# Patient Record
Sex: FEMALE | Race: WHITE | NOT HISPANIC OR LATINO | Employment: OTHER | URBAN - METROPOLITAN AREA
[De-identification: names, ages, dates, MRNs, and addresses within clinical notes are randomized per-mention and may not be internally consistent; named-entity substitution may affect disease eponyms.]

---

## 2023-08-26 PROBLEM — F02.80 PRIMARY DEGENERATIVE DEMENTIA OF THE ALZHEIMER TYPE, SENILE ONSET, UNCOMPLICATED (MULTI): Status: ACTIVE | Noted: 2023-08-26

## 2023-08-26 PROBLEM — M35.3 POLYMYALGIA (MULTI): Status: ACTIVE | Noted: 2023-08-26

## 2023-08-26 PROBLEM — G89.29 OTHER CHRONIC PAIN: Status: ACTIVE | Noted: 2023-08-26

## 2023-08-26 PROBLEM — D50.9 IRON DEFICIENCY ANEMIA: Status: ACTIVE | Noted: 2023-08-26

## 2023-08-26 PROBLEM — I35.8 AORTIC VALVE SCLEROSIS: Status: ACTIVE | Noted: 2023-08-26

## 2023-08-26 PROBLEM — G30.1 PRIMARY DEGENERATIVE DEMENTIA OF THE ALZHEIMER TYPE, SENILE ONSET, UNCOMPLICATED (MULTI): Status: ACTIVE | Noted: 2023-08-26

## 2023-08-26 PROBLEM — L90.0 LICHEN SCLEROSUS ET ATROPHICUS: Status: ACTIVE | Noted: 2023-08-26

## 2023-08-26 PROBLEM — I09.9 RHEUMATIC HEART DISEASE: Status: ACTIVE | Noted: 2023-08-26

## 2023-08-26 PROBLEM — G56.92 NEUROPATHY OF LEFT UPPER EXTREMITY: Status: ACTIVE | Noted: 2023-08-26

## 2023-08-26 PROBLEM — R26.89 BALANCE DISORDER: Status: ACTIVE | Noted: 2023-08-26

## 2023-08-26 PROBLEM — E78.5 HYPERLIPIDEMIA: Status: ACTIVE | Noted: 2023-08-26

## 2023-08-26 PROBLEM — I10 ESSENTIAL HYPERTENSION: Status: ACTIVE | Noted: 2023-08-26

## 2023-08-26 PROBLEM — R63.1 EXCESSIVE THIRST: Status: ACTIVE | Noted: 2023-08-26

## 2023-08-26 PROBLEM — I06.0 RHEUMATIC AORTIC STENOSIS: Status: ACTIVE | Noted: 2023-08-26

## 2023-08-26 PROBLEM — K22.70 BARRETT ESOPHAGUS: Status: ACTIVE | Noted: 2023-08-26

## 2023-08-26 PROBLEM — E55.9 VITAMIN D DEFICIENCY: Status: ACTIVE | Noted: 2023-08-26

## 2023-08-26 PROBLEM — N81.11 CYSTOCELE, MIDLINE: Status: ACTIVE | Noted: 2023-08-26

## 2023-08-26 PROBLEM — E03.8 SUBCLINICAL HYPOTHYROIDISM: Status: ACTIVE | Noted: 2023-08-26

## 2023-08-26 PROBLEM — G47.19 EXCESSIVE DAYTIME SLEEPINESS: Status: ACTIVE | Noted: 2023-08-26

## 2023-08-26 PROBLEM — F02.80 DEMENTIA ASSOCIATED WITH OTHER UNDERLYING DISEASE WITHOUT BEHAVIORAL DISTURBANCE (MULTI): Status: ACTIVE | Noted: 2023-08-26

## 2023-08-26 RX ORDER — ACETAMINOPHEN 500 MG
TABLET ORAL
COMMUNITY

## 2023-08-26 RX ORDER — RAMIPRIL 10 MG/1
CAPSULE ORAL
COMMUNITY
End: 2023-11-30 | Stop reason: SDUPTHER

## 2023-08-26 RX ORDER — BETAMETHASONE DIPROPIONATE 0.5 MG/G
CREAM TOPICAL
COMMUNITY
Start: 2023-02-01 | End: 2023-10-26 | Stop reason: WASHOUT

## 2023-08-26 RX ORDER — GABAPENTIN 100 MG/1
1 CAPSULE ORAL 2 TIMES DAILY
COMMUNITY
End: 2023-11-29

## 2023-08-26 RX ORDER — ESOMEPRAZOLE MAGNESIUM 40 MG/1
1 CAPSULE, DELAYED RELEASE ORAL DAILY
COMMUNITY
End: 2023-11-30 | Stop reason: SDUPTHER

## 2023-08-26 RX ORDER — AZITHROMYCIN 250 MG/1
TABLET, FILM COATED ORAL
COMMUNITY
Start: 2023-03-22 | End: 2023-10-26 | Stop reason: WASHOUT

## 2023-08-26 RX ORDER — BENZONATATE 100 MG/1
CAPSULE ORAL
COMMUNITY
Start: 2023-03-24 | End: 2023-10-26 | Stop reason: WASHOUT

## 2023-08-26 RX ORDER — DILTIAZEM HYDROCHLORIDE 180 MG/1
CAPSULE, EXTENDED RELEASE ORAL
COMMUNITY
End: 2024-06-04

## 2023-08-26 RX ORDER — HYDROCHLOROTHIAZIDE 25 MG/1
25 TABLET ORAL
COMMUNITY

## 2023-09-14 ENCOUNTER — HOSPITAL ENCOUNTER (OUTPATIENT)
Dept: DATA CONVERSION | Facility: HOSPITAL | Age: 88
Discharge: HOME | End: 2023-09-14
Payer: MEDICARE

## 2023-09-14 DIAGNOSIS — M54.50 LOW BACK PAIN, UNSPECIFIED: ICD-10-CM

## 2023-09-14 DIAGNOSIS — R82.90 UNSPECIFIED ABNORMAL FINDINGS IN URINE: ICD-10-CM

## 2023-09-14 LAB
AMOXICILLIN+CLAV SUSC ISLT: NORMAL
AMPICILLIN SUSC ISLT: NORMAL
AMPICILLIN+SULBAC SUSC ISLT: NORMAL
BACTERIA ISLT: NORMAL
BACTERIA SPEC CULT: NORMAL
BACTERIA UR QL AUTO: POSITIVE
BILIRUB UR QL STRIP.AUTO: NEGATIVE
CC # UR: NORMAL /UL
CEFAZOLIN SUSC ISLT: NORMAL
CIPROFLOXACIN SUSC ISLT: NORMAL
CLARITY UR: CLEAR
COLOR UR: ABNORMAL
GENTAMICIN ISLT MLC: NORMAL
GLUCOSE UR STRIP.AUTO-MCNC: NEGATIVE MG/DL
HGB UR QL STRIP.AUTO: 1 /HPF (ref 0–3)
HGB UR QL: NEGATIVE
HYALINE CASTS UR QL AUTO: ABNORMAL /LPF
KETONES UR QL STRIP.AUTO: NEGATIVE
LEUKOCYTE ESTERASE UR QL STRIP.AUTO: ABNORMAL
LEVOFLOXACIN SUSC ISLT: NORMAL
MEROPENEM SUSC ISLT: NORMAL
MIC (SUSCEPTIBILITY): NORMAL
MICROSCOPIC (UA): ABNORMAL
NITRITE UR QL STRIP.AUTO: POSITIVE
NITROFURANTOIN SUSC ISLT: NORMAL
PH UR STRIP.AUTO: 6 [PH] (ref 4.6–8)
PIP+TAZO SUSC ISLT: NORMAL
PROT UR STRIP.AUTO-MCNC: NEGATIVE MG/DL
REPORT STATUS -LH SQ DATA CONVERSION: NORMAL
SERVICE CMNT-IMP: NORMAL
SP GR UR STRIP.AUTO: 1.01 (ref 1–1.03)
SPECIMEN SOURCE: NORMAL
SQUAMOUS UR QL AUTO: ABNORMAL /HPF
TETRACYCLINE SUSC ISLT: NORMAL
TMP SMX SUSC ISLT: NORMAL
URINE CULTURE: ABNORMAL
UROBILINOGEN UR QL STRIP.AUTO: NORMAL MG/DL (ref 0–1)
WBC #/AREA URNS AUTO: 14 /HPF (ref 0–3)

## 2023-10-02 ENCOUNTER — TELEPHONE (OUTPATIENT)
Dept: PRIMARY CARE | Facility: CLINIC | Age: 88
End: 2023-10-02
Payer: MEDICARE

## 2023-10-02 ENCOUNTER — LAB (OUTPATIENT)
Dept: LAB | Facility: LAB | Age: 88
End: 2023-10-02
Payer: MEDICARE

## 2023-10-02 DIAGNOSIS — R30.0 DYSURIA: ICD-10-CM

## 2023-10-02 LAB
APPEARANCE UR: ABNORMAL
BILIRUB UR STRIP.AUTO-MCNC: NEGATIVE MG/DL
COLOR UR: ABNORMAL
GLUCOSE UR STRIP.AUTO-MCNC: NORMAL MG/DL
KETONES UR STRIP.AUTO-MCNC: NEGATIVE MG/DL
LEUKOCYTE ESTERASE UR QL STRIP.AUTO: ABNORMAL
NITRITE UR QL STRIP.AUTO: NEGATIVE
PH UR STRIP.AUTO: 5.5 [PH]
PROT UR STRIP.AUTO-MCNC: NEGATIVE MG/DL
RBC # UR STRIP.AUTO: NEGATIVE /UL
RBC #/AREA URNS AUTO: NORMAL /HPF
SP GR UR STRIP.AUTO: 1.01
UROBILINOGEN UR STRIP.AUTO-MCNC: NORMAL MG/DL
WBC #/AREA URNS AUTO: NORMAL /HPF

## 2023-10-02 PROCEDURE — 87186 SC STD MICRODIL/AGAR DIL: CPT

## 2023-10-02 PROCEDURE — 87086 URINE CULTURE/COLONY COUNT: CPT

## 2023-10-02 NOTE — TELEPHONE ENCOUNTER
Per daughter Giulia pt has UTI today, she did a home test that came back positive. She is asking for an order to drop off sample. She already got a specimen cup from the lab and can drop off today.

## 2023-10-05 LAB — BACTERIA UR CULT: ABNORMAL

## 2023-10-25 ENCOUNTER — TELEPHONE (OUTPATIENT)
Dept: PRIMARY CARE | Facility: CLINIC | Age: 88
End: 2023-10-25
Payer: MEDICARE

## 2023-10-26 ENCOUNTER — OFFICE VISIT (OUTPATIENT)
Dept: PRIMARY CARE | Facility: CLINIC | Age: 88
End: 2023-10-26
Payer: MEDICARE

## 2023-10-26 VITALS
OXYGEN SATURATION: 98 % | SYSTOLIC BLOOD PRESSURE: 118 MMHG | HEIGHT: 59 IN | HEART RATE: 74 BPM | BODY MASS INDEX: 27.01 KG/M2 | TEMPERATURE: 97.1 F | RESPIRATION RATE: 16 BRPM | WEIGHT: 134 LBS | DIASTOLIC BLOOD PRESSURE: 70 MMHG

## 2023-10-26 DIAGNOSIS — E55.9 VITAMIN D DEFICIENCY: ICD-10-CM

## 2023-10-26 DIAGNOSIS — G30.9 ALZHEIMER'S DISEASE (MULTI): ICD-10-CM

## 2023-10-26 DIAGNOSIS — K22.70 BARRETT'S ESOPHAGUS WITHOUT DYSPLASIA: ICD-10-CM

## 2023-10-26 DIAGNOSIS — F02.80 ALZHEIMER'S DISEASE (MULTI): ICD-10-CM

## 2023-10-26 DIAGNOSIS — I10 ESSENTIAL HYPERTENSION, BENIGN: ICD-10-CM

## 2023-10-26 DIAGNOSIS — N30.00 ACUTE CYSTITIS WITHOUT HEMATURIA: Primary | ICD-10-CM

## 2023-10-26 PROBLEM — M75.121 COMPLETE TEAR OF RIGHT ROTATOR CUFF: Status: ACTIVE | Noted: 2017-04-21

## 2023-10-26 PROBLEM — M25.511 PAIN IN JOINT OF RIGHT SHOULDER: Status: ACTIVE | Noted: 2017-04-21

## 2023-10-26 PROBLEM — M89.9 DISORDER OF BONE AND CARTILAGE: Status: ACTIVE | Noted: 2023-10-26

## 2023-10-26 PROBLEM — M94.9 DISORDER OF BONE AND CARTILAGE: Status: ACTIVE | Noted: 2023-10-26

## 2023-10-26 PROCEDURE — 1160F RVW MEDS BY RX/DR IN RCRD: CPT | Performed by: NURSE PRACTITIONER

## 2023-10-26 PROCEDURE — 99349 HOME/RES VST EST MOD MDM 40: CPT | Performed by: NURSE PRACTITIONER

## 2023-10-26 PROCEDURE — 1159F MED LIST DOCD IN RCRD: CPT | Performed by: NURSE PRACTITIONER

## 2023-10-26 PROCEDURE — 1126F AMNT PAIN NOTED NONE PRSNT: CPT | Performed by: NURSE PRACTITIONER

## 2023-10-26 PROCEDURE — 1036F TOBACCO NON-USER: CPT | Performed by: NURSE PRACTITIONER

## 2023-10-26 PROCEDURE — 3074F SYST BP LT 130 MM HG: CPT | Performed by: NURSE PRACTITIONER

## 2023-10-26 PROCEDURE — 3078F DIAST BP <80 MM HG: CPT | Performed by: NURSE PRACTITIONER

## 2023-10-26 ASSESSMENT — ENCOUNTER SYMPTOMS
VOMITING: 0
APPETITE CHANGE: 0
LIGHT-HEADEDNESS: 0
DIARRHEA: 0
CHILLS: 0
CONSTIPATION: 0
LOSS OF SENSATION IN FEET: 0
ARTHRALGIAS: 1
WHEEZING: 0
PALPITATIONS: 0
TROUBLE SWALLOWING: 0
FEVER: 0
DEPRESSION: 1
DIZZINESS: 0
ENDOCRINE COMMENTS: POSITIVE FOR THYROID DISEASE
NAUSEA: 0
OCCASIONAL FEELINGS OF UNSTEADINESS: 1
ABDOMINAL PAIN: 0
UNEXPECTED WEIGHT CHANGE: 0
COUGH: 0
SHORTNESS OF BREATH: 0

## 2023-10-26 ASSESSMENT — PAIN SCALES - GENERAL: PAINLEVEL: 0-NO PAIN

## 2023-10-26 NOTE — PROGRESS NOTES
Subjective   Patient ID: Enriqueta Dumont is a 89 y.o. female who presents for Follow-up (Chronic medical conditions).    Visit for 89 y/o female seen today in private apartment, accompanied by daughter Giulia and caregiver Aiyana for routine follow up of chronic medical conditions. Patient is sitting in chair at dining room table this afternoon. She is alert to self and place. Disoriented to time. She is able to answer simple questions regarding her health but is an overall poor historian 2/2 dementia. Her daughter and caregiver supplement HPI as needed. Patient lives in an apartment by herself. She does not drive or have a vehicle. She has children that live locally and check in frequently. Her caregiver is in the house 4x weekly. Patient has limited mobility. Ambulates with a cane. She denies recent fall or injury. Caregiver does try and get patient out to walk around the apartment complex. Family has cameras in the house and they monitor patient in the evenings. Patient requires assistance with ADLs. Her caregiver helps with dressing, bathing/hygiene and toileting. Her family/caregiver prepares all meals for patient. She denies appetite changes, signs of weight loss. Denies abdominal pain, nausea, vomiting. Denies bowel or bladder concerns. She will have occasional incontinence episodes if she does not make it to the bathroom in time. She reported worsening back pain last month. Was found to have a UTI. 9/16 urine culture was positive >100,000 CFU/ml E. Coli. She was treated with course of Macrobid. Denies any further urinary concerns. Denies back pain today. Patient has difficulty sleeping at times. She will wake up frequently and wander into the kitchen to get water or go to the bathroom in the evenings. Patient does not remember waking up frequently. She does take naps throughout the day. Patient has short term memory loss. She will frequently repeat the same stories and conversations. She talks about a lot of  memories from her childhood and past. Has not had any recent hospitalizations.     Home Visit:          Medically necessary due to: Illness or condition that results in activity lmitation or restriction that impacts the ability to leave home such as:, unsteady gait/poor balance, dementia/cognitive impairment         Current Outpatient Medications:     cholecalciferol (Vitamin D3) 50 mcg (2,000 unit) capsule, 1 capsule Orally Once a day, Disp: , Rfl:     dilTIAZem XR (DILT-XR) 180 mg 24 hr capsule, TAKE 1 CAPSULE BY MOUTH EVERY DAY FOR 90 DAYS Orally Once a day, Disp: , Rfl:     esomeprazole (NexIUM) 40 mg DR capsule, Take 1 capsule (40 mg) by mouth once daily. for 90 days, Disp: , Rfl:     gabapentin (Neurontin) 100 mg capsule, Take 1 capsule (100 mg) by mouth 2 times a day. for 90 days, Disp: , Rfl:     hydroCHLOROthiazide (HYDRODiuril) 25 mg tablet, TAKE 1 TABLET BY MOUTH EVERY DAY IN THE MORNING orally Daily, Disp: , Rfl:     Lactobacillus acidophilus (PROBIOTIC ACIDOPHILUS ORAL), Probiotic, Disp: , Rfl:     multivit-minerals/folic acid (ADULT MULTIVITAMIN GUMMIES ORAL), as directed Orally, Disp: , Rfl:     ramipril (Altace) 10 mg capsule, TAKE 1 CAPSULE BY MOUTH ONCE A DAY. ORALLY ONCE A DAY 90 DAYS Orally Once a day for 90 days, Disp: , Rfl:      Review of Systems   Constitutional:  Negative for appetite change, chills, fever and unexpected weight change.   HENT:  Positive for hearing loss (bilateral hearing aides). Negative for trouble swallowing.    Respiratory:  Negative for cough, shortness of breath and wheezing.    Cardiovascular:  Negative for chest pain, palpitations and leg swelling.   Gastrointestinal:  Negative for abdominal pain, constipation, diarrhea, nausea and vomiting.   Endocrine:        Positive for thyroid disease    Genitourinary:         Positive for urinary incontinence    Musculoskeletal:  Positive for arthralgias (chronic shoulder pain, lower back) and gait problem.   Neurological:   "Negative for dizziness and light-headedness.        Positive for memory loss   Psychiatric/Behavioral:          Positive for depression. Negative for anxiety      Objective   /70 (BP Location: Left arm, Patient Position: Sitting, BP Cuff Size: Adult)   Pulse 74   Temp 36.2 °C (97.1 °F) (Temporal)   Resp 16   Ht 1.499 m (4' 11\")   Wt 60.8 kg (134 lb)   SpO2 98%   BMI 27.06 kg/m²     Physical Exam  Constitutional:       General: She is not in acute distress.     Appearance: Normal appearance.      Comments: Sitting at dining room table, legs dependent    HENT:      Head: Normocephalic and atraumatic.      Comments: Bilateral hearing aides      Nose: Nose normal.      Mouth/Throat:      Mouth: Mucous membranes are moist.      Pharynx: Oropharynx is clear.   Eyes:      Pupils: Pupils are equal, round, and reactive to light.      Comments: Wearing glasses   Neck:      Vascular: No carotid bruit.   Cardiovascular:      Rate and Rhythm: Regular rhythm.      Heart sounds: Normal heart sounds.      Comments: Trace lower extremity edema, R > L  Pulmonary:      Effort: Pulmonary effort is normal. No respiratory distress.      Breath sounds: Normal breath sounds. No wheezing, rhonchi or rales.   Abdominal:      General: Bowel sounds are normal. There is no distension.      Palpations: Abdomen is soft.      Tenderness: There is no abdominal tenderness. There is no right CVA tenderness or left CVA tenderness.   Musculoskeletal:      Cervical back: Neck supple.      Comments: Ambulatory with cane   Skin:     General: Skin is warm and dry.   Neurological:      Mental Status: She is alert.      Gait: Gait abnormal.      Comments: Disoriented to time    Psychiatric:         Mood and Affect: Mood normal.     Assessment/Plan   Diagnoses and all orders for this visit:  Acute cystitis without hematuria  Comments:  Resolved. s/p Macrobid. No further urinary concerns reported.  Essential hypertension, " benign  Comments:  chronic, vitals stable. We can stop the Ramipril if patient/family are agreeable.  Duke's esophagus without dysplasia  Comments:  chronic, stable, continue Nexium  Vitamin D deficiency  Comments:  chronic, stable, continue vitamin d supplement  Alzheimer's disease (CMS/HCC)  Comments:  chronic, progressive, requires asssitance with ADLs. Short term memory loss noted.    Patient stable. Discussed stopping patients Ramipril as Bps are continuously stable.   Depression/insomnia- discussed starting Zoloft 25mg at bedtime. Dtr Giulia would like to discuss any potential medication changes with her siblings first. Advised Giulia to contact house calls office if she decides to have patient start Zoloft. She is to contact house calls office with any acute concerns or medication needs.      Cathy Meza, APRN-CNP

## 2023-11-28 DIAGNOSIS — G56.92 UNSPECIFIED MONONEUROPATHY OF LEFT UPPER LIMB: ICD-10-CM

## 2023-11-29 RX ORDER — GABAPENTIN 100 MG/1
100 CAPSULE ORAL 2 TIMES DAILY
Qty: 180 CAPSULE | Refills: 1 | Status: SHIPPED | OUTPATIENT
Start: 2023-11-29

## 2023-11-30 DIAGNOSIS — I10 ESSENTIAL (PRIMARY) HYPERTENSION: ICD-10-CM

## 2023-11-30 DIAGNOSIS — K22.70 BARRETT'S ESOPHAGUS WITHOUT DYSPLASIA: ICD-10-CM

## 2023-11-30 RX ORDER — RAMIPRIL 10 MG/1
CAPSULE ORAL
Qty: 90 CAPSULE | Refills: 3 | Status: SHIPPED | OUTPATIENT
Start: 2023-11-30

## 2023-11-30 RX ORDER — ESOMEPRAZOLE MAGNESIUM 40 MG/1
40 CAPSULE, DELAYED RELEASE ORAL DAILY
Qty: 90 CAPSULE | Refills: 3 | Status: SHIPPED | OUTPATIENT
Start: 2023-11-30

## 2023-12-13 ENCOUNTER — TELEPHONE (OUTPATIENT)
Dept: PRIMARY CARE | Facility: CLINIC | Age: 88
End: 2023-12-13
Payer: MEDICARE

## 2023-12-13 DIAGNOSIS — M54.50 ACUTE BILATERAL LOW BACK PAIN, UNSPECIFIED WHETHER SCIATICA PRESENT: Primary | ICD-10-CM

## 2023-12-13 RX ORDER — METHYLPREDNISOLONE 4 MG/1
TABLET ORAL
Qty: 21 TABLET | Refills: 0 | Status: SHIPPED | OUTPATIENT
Start: 2023-12-13 | End: 2023-12-20

## 2023-12-13 NOTE — TELEPHONE ENCOUNTER
"Giulia calls today to report patient has had ongoing lower back pain complaint.  Giulia denies trauma, or injury to patient lower back but does report when the patient saw the \"lady urology doctor\" was told she has a prolapsed bladder.  Patient does not have increased urinary incontinence, , does not complain of burning or discomfort with urination and is eating and drinking per usual.  Patient has used a variety of over the counter medications including but not limited too: Tylenol Arthrits, Yo Aspirin, heating pad, Voltaren topical and Advil.  Giulia reports the Advil worked best overall, but they don't want to give patient \"too much\" Giulia further stating it's very hard to hear her mom calling and crying even after we've tried so many things. Is there anything Cathy can suggest we try to help?  Please Advise.   "

## 2023-12-13 NOTE — TELEPHONE ENCOUNTER
Call placed to patient daughterGiulia at number as shown, information relayed as per provider has written, Giulia takes information and states she will return call to office with information as family needs to discuss.

## 2024-01-24 ENCOUNTER — TELEPHONE (OUTPATIENT)
Dept: PRIMARY CARE | Facility: CLINIC | Age: 89
End: 2024-01-24
Payer: MEDICARE

## 2024-01-24 NOTE — TELEPHONE ENCOUNTER
Call placed to patient number as listed, [phone], speaking with patient daughter Concepcion, appointment confirmed.  COVID Screening completed.

## 2024-01-25 ENCOUNTER — OFFICE VISIT (OUTPATIENT)
Dept: PRIMARY CARE | Facility: CLINIC | Age: 89
End: 2024-01-25
Payer: MEDICARE

## 2024-01-25 VITALS
RESPIRATION RATE: 18 BRPM | TEMPERATURE: 97.3 F | DIASTOLIC BLOOD PRESSURE: 54 MMHG | OXYGEN SATURATION: 97 % | HEART RATE: 73 BPM | SYSTOLIC BLOOD PRESSURE: 112 MMHG | WEIGHT: 135 LBS | BODY MASS INDEX: 27.21 KG/M2 | HEIGHT: 59 IN

## 2024-01-25 DIAGNOSIS — I10 ESSENTIAL HYPERTENSION, BENIGN: Primary | ICD-10-CM

## 2024-01-25 DIAGNOSIS — G30.1 PRIMARY DEGENERATIVE DEMENTIA OF THE ALZHEIMER TYPE, SENILE ONSET, UNCOMPLICATED (MULTI): ICD-10-CM

## 2024-01-25 DIAGNOSIS — K22.70 BARRETT'S ESOPHAGUS WITHOUT DYSPLASIA: ICD-10-CM

## 2024-01-25 DIAGNOSIS — F02.80 PRIMARY DEGENERATIVE DEMENTIA OF THE ALZHEIMER TYPE, SENILE ONSET, UNCOMPLICATED (MULTI): ICD-10-CM

## 2024-01-25 DIAGNOSIS — K59.00 CONSTIPATION, UNSPECIFIED CONSTIPATION TYPE: ICD-10-CM

## 2024-01-25 DIAGNOSIS — I35.8 AORTIC VALVE SCLEROSIS: ICD-10-CM

## 2024-01-25 PROCEDURE — 1126F AMNT PAIN NOTED NONE PRSNT: CPT | Performed by: NURSE PRACTITIONER

## 2024-01-25 PROCEDURE — 1036F TOBACCO NON-USER: CPT | Performed by: NURSE PRACTITIONER

## 2024-01-25 PROCEDURE — 99349 HOME/RES VST EST MOD MDM 40: CPT | Performed by: NURSE PRACTITIONER

## 2024-01-25 PROCEDURE — 1160F RVW MEDS BY RX/DR IN RCRD: CPT | Performed by: NURSE PRACTITIONER

## 2024-01-25 PROCEDURE — 1159F MED LIST DOCD IN RCRD: CPT | Performed by: NURSE PRACTITIONER

## 2024-01-25 PROCEDURE — 3074F SYST BP LT 130 MM HG: CPT | Performed by: NURSE PRACTITIONER

## 2024-01-25 PROCEDURE — 3078F DIAST BP <80 MM HG: CPT | Performed by: NURSE PRACTITIONER

## 2024-01-25 ASSESSMENT — PAIN SCALES - GENERAL: PAINLEVEL: 0-NO PAIN

## 2024-01-25 NOTE — PROGRESS NOTES
"Subjective   Patient ID: Enriqueta Dumont is a 89 y.o. female who presents for Follow-up (Routine follow up, chronic medical conditions ).    Visit for 89 y/o female seen today in private apartment, accompanied by daughter Giulia and caregiver Aiyana for routine follow up. Patient is sitting at dining room table. She is alert to self and place, disoriented to time. She has short term memory impairment. Continues to live in an apartment by herself. She has a caregiver in the house 4x weekly and she has children that live locally that check in on her frequently. Her family has cameras in the home and they monitor them daily. Pt does not drive or have a vehicle. She stays in the apartment most of the time but family will take her out around the holidays. Patient is ambulatory with a cane. She denies any recent falls or injuries. Patient requires assistance with her ADLs. Her caregiver assists with dressing, bathing/hygiene, toileting and housekeeping. Patient reports that she still vacuums and does the dishes but her family denies this. Her family/caregiver prepares all meals for patient. Her appetite is stable. She is drinking chocolate protein shakes daily. Denies abdominal pain, nausea, vomiting. She admits to occasional constipation but recently started taking a probiotic and this has improved. She typically has a BM in the mornings. Pt denies any urinary concerns. She does not sleep well at night. She wakes up frequently, gets out of bed, goes to the bathroom. She does not remember this. She has been recommended to start Sertraline in the past for her depression and insomnia but family has not agreed to this. Patient wakes up reporting \"an achy back\" in the mornings. She has been taking Advil with Tylenol and this has been helpful.     Home Visit:          Medically necessary due to: Illness or condition that results in activity lmitation or restriction that impacts the ability to leave home such as:, unsteady " gait/poor balance, dementia/cognitive impairment         Current Outpatient Medications:     cholecalciferol (Vitamin D3) 50 mcg (2,000 unit) capsule, 1 capsule Orally Once a day, Disp: , Rfl:     dilTIAZem XR (DILT-XR) 180 mg 24 hr capsule, TAKE 1 CAPSULE BY MOUTH EVERY DAY FOR 90 DAYS Orally Once a day, Disp: , Rfl:     esomeprazole (NexIUM) 40 mg DR capsule, Take 1 capsule (40 mg) by mouth once daily., Disp: 90 capsule, Rfl: 3    gabapentin (Neurontin) 100 mg capsule, TAKE 1 CAPSULE BY MOUTH TWICE A DAY, Disp: 180 capsule, Rfl: 1    hydroCHLOROthiazide (HYDRODiuril) 25 mg tablet, TAKE 1 TABLET BY MOUTH EVERY DAY IN THE MORNING orally Daily, Disp: , Rfl:     Lactobacillus acidophilus (PROBIOTIC ACIDOPHILUS ORAL), Probiotic, Disp: , Rfl:     multivit-minerals/folic acid (ADULT MULTIVITAMIN GUMMIES ORAL), as directed Orally, Disp: , Rfl:     ramipril (Altace) 10 mg capsule, TAKE 1 CAPSULE BY MOUTH ONCE A DAY. ORALLY ONCE A DAY 90 DAYS ORALLY ONCE A DAY 90 DAYS, Disp: 90 capsule, Rfl: 3     Review of Systems  Constitutional:  Negative for appetite change, chills, fever and unexpected weight change.   HENT:  Positive for hearing loss. Negative for trouble swallowing.    Respiratory:  Negative for cough, shortness of breath and wheezing.    Cardiovascular:  Negative for chest pain, palpitations and leg swelling.   Gastrointestinal: Positive for constipation. Negative for abdominal pain, diarrhea, nausea and vomiting.   Endocrine: Positive for thyroid disease  Genitourinary: Positive for urinary incontinence  Musculoskeletal:  Positive for arthralgias, chronic shoulder pain, lower back, unsteady gait   Neurological: Positive for memory loss. Negative for dizziness and light-headedness.   Psychiatric/Behavioral: Positive for depression, insomnia. Negative for anxiety.     Objective   /54 (BP Location: Left arm, Patient Position: Sitting, BP Cuff Size: Adult)   Pulse 73   Temp 36.3 °C (97.3 °F) (Temporal)   Resp  "18   Ht 1.499 m (4' 11\")   Wt 61.2 kg (135 lb)   SpO2 97%   BMI 27.27 kg/m²     Physical Exam  Constitutional:       General: She is not in acute distress.     Appearance: Normal appearance.      Comments: Alert, sitting at table  HENT:      Head: Normocephalic and atraumatic.      Comments: Bilateral hearing aides      Nose: Nose normal.      Mouth/Throat:      Mouth: Mucous membranes are moist.      Pharynx: Oropharynx is clear.   Eyes:      Pupils: Pupils are equal, round, and reactive to light.      Comments: wearing glasses    Neck:      Vascular: No carotid bruit.   Cardiovascular:      Rate and Rhythm: Regular rhythm.      Heart sounds: Normal heart sounds.      Comments: no edema   Pulmonary:      Effort: Pulmonary effort is normal. No respiratory distress.      Breath sounds: Normal breath sounds. No wheezing, rhonchi or rales.   Abdominal:      General: Bowel sounds are normal. There is no distension.      Palpations: Abdomen is soft.      Tenderness: There is no abdominal tenderness. There is no right CVA tenderness or left CVA tenderness.   Musculoskeletal:      Cervical back: Neck supple.      Comments: Ambulates with cane   Skin:     General: Skin is warm and dry.   Neurological:      Mental Status: She is alert.      Gait: Unsteady      Comments: Short term memory impairment noted. Disoriented to time    Psychiatric:         Mood and Affect: Mood normal.     Assessment/Plan   Diagnoses and all orders for this visit:  Essential hypertension, benign  Comments:  chronic, vitals stable, continue HCTZ, Ramipril, Diltiazem  Aortic valve sclerosis  Comments:  chronic  Constipation, unspecified constipation type  Comments:  chronic, improved with Probiotic.  Duke's esophagus without dysplasia  Comments:  chronic, stable. Contiune Nexium  Primary degenerative dementia of the Alzheimer type, senile onset, uncomplicated (CMS/HCC)  Comments:  chronic, progressive, requires asssitance with ADLs. Short term " memory loss noted. Frequently repeats herself.    Patient stable. Will continue house calls NP program due to limited mobility, cognitive impairment. Will follow up with pt in 3 months and update annual labs at that time. Advised pt/daughter to contact house calls office with any acute concerns ot medication needs.        Cathy Meza, APRN-CNP

## 2024-01-26 PROBLEM — G47.9 DIFFICULTY SLEEPING: Status: ACTIVE | Noted: 2024-01-26

## 2024-02-14 ENCOUNTER — OFFICE VISIT (OUTPATIENT)
Dept: OBSTETRICS AND GYNECOLOGY | Facility: CLINIC | Age: 89
End: 2024-02-14
Payer: MEDICARE

## 2024-02-14 VITALS
WEIGHT: 132.2 LBS | SYSTOLIC BLOOD PRESSURE: 120 MMHG | HEIGHT: 58 IN | BODY MASS INDEX: 27.75 KG/M2 | DIASTOLIC BLOOD PRESSURE: 90 MMHG

## 2024-02-14 DIAGNOSIS — L90.0 LICHEN SCLEROSUS ET ATROPHICUS: ICD-10-CM

## 2024-02-14 DIAGNOSIS — N81.11 CYSTOCELE, MIDLINE: Primary | ICD-10-CM

## 2024-02-14 DIAGNOSIS — N90.4 LICHEN SCLEROSUS ET ATROPHICUS OF THE VULVA: ICD-10-CM

## 2024-02-14 DIAGNOSIS — N30.01 ACUTE CYSTITIS WITH HEMATURIA: ICD-10-CM

## 2024-02-14 LAB
BILIRUBIN, POC: NEGATIVE
BLOOD URINE, POC: POSITIVE
CLARITY, POC: CLEAR
COLOR, POC: YELLOW
GLUCOSE URINE, POC: NEGATIVE
KETONES, POC: NEGATIVE
LEUKOCYTE EST, POC: ABNORMAL
NITRITE, POC: NEGATIVE
PH, POC: 5
POC APPEARANCE OF BODY FLUID: CLEAR
SPECIFIC GRAVITY, POC: 1.01
URINE PROTEIN, POC: ABNORMAL
UROBILINOGEN, POC: NEGATIVE

## 2024-02-14 PROCEDURE — 1036F TOBACCO NON-USER: CPT | Performed by: OBSTETRICS & GYNECOLOGY

## 2024-02-14 PROCEDURE — 3080F DIAST BP >= 90 MM HG: CPT | Performed by: OBSTETRICS & GYNECOLOGY

## 2024-02-14 PROCEDURE — 1160F RVW MEDS BY RX/DR IN RCRD: CPT | Performed by: OBSTETRICS & GYNECOLOGY

## 2024-02-14 PROCEDURE — 87086 URINE CULTURE/COLONY COUNT: CPT | Mod: WESLAB | Performed by: OBSTETRICS & GYNECOLOGY

## 2024-02-14 PROCEDURE — 1159F MED LIST DOCD IN RCRD: CPT | Performed by: OBSTETRICS & GYNECOLOGY

## 2024-02-14 PROCEDURE — 1126F AMNT PAIN NOTED NONE PRSNT: CPT | Performed by: OBSTETRICS & GYNECOLOGY

## 2024-02-14 PROCEDURE — 81002 URINALYSIS NONAUTO W/O SCOPE: CPT | Performed by: OBSTETRICS & GYNECOLOGY

## 2024-02-14 PROCEDURE — 3074F SYST BP LT 130 MM HG: CPT | Performed by: OBSTETRICS & GYNECOLOGY

## 2024-02-14 PROCEDURE — 99213 OFFICE O/P EST LOW 20 MIN: CPT | Performed by: OBSTETRICS & GYNECOLOGY

## 2024-02-14 RX ORDER — NITROFURANTOIN 25; 75 MG/1; MG/1
100 CAPSULE ORAL EVERY 12 HOURS SCHEDULED
Qty: 10 CAPSULE | Refills: 0 | Status: SHIPPED | OUTPATIENT
Start: 2024-02-14 | End: 2024-02-19

## 2024-02-14 RX ORDER — TRIAMCINOLONE ACETONIDE 5 MG/G
CREAM TOPICAL 3 TIMES DAILY
Qty: 454 G | Refills: 1 | Status: SHIPPED | OUTPATIENT
Start: 2024-02-14 | End: 2024-05-14

## 2024-02-14 ASSESSMENT — PATIENT HEALTH QUESTIONNAIRE - PHQ9
10. IF YOU CHECKED OFF ANY PROBLEMS, HOW DIFFICULT HAVE THESE PROBLEMS MADE IT FOR YOU TO DO YOUR WORK, TAKE CARE OF THINGS AT HOME, OR GET ALONG WITH OTHER PEOPLE: EXTREMELY DIFFICULT
1. LITTLE INTEREST OR PLEASURE IN DOING THINGS: NOT AT ALL
2. FEELING DOWN, DEPRESSED OR HOPELESS: MORE THAN HALF THE DAYS
SUM OF ALL RESPONSES TO PHQ9 QUESTIONS 1 AND 2: 2

## 2024-02-14 ASSESSMENT — ENCOUNTER SYMPTOMS
LOSS OF SENSATION IN FEET: 0
OCCASIONAL FEELINGS OF UNSTEADINESS: 0
DEPRESSION: 0

## 2024-02-14 ASSESSMENT — PAIN SCALES - GENERAL: PAINLEVEL: 0-NO PAIN

## 2024-02-14 NOTE — PROGRESS NOTES
GYN OFFICE VISIT    Patient Name:  Enriqueta Dumont  :  9/15/1931  MR #:  [id number]  Acct #:  [id number]      ASSESSMENT/PLAN:     There are no diagnoses linked to this encounter.     Enriqueta was seen today for pelvic pain.  Diagnoses and all orders for this visit:  Cystocele, midline (Primary)  Lichen sclerosus et atrophicus  Acute cystitis with hematuria  -     nitrofurantoin, macrocrystal-monohydrate, (Macrobid) 100 mg capsule; Take 1 capsule (100 mg) by mouth every 12 hours for 5 days.  -     POCT urinalysis dipstick manually resulted  -     Urine culture  Lichen sclerosus et atrophicus of the vulva  -     triamcinolone (Kenalog) 0.5 % cream; Apply topically 3 times a day.      Cystocele, midline  Due to dimentia, recommend against pessary,  consider colpoclesis, would need referral to urogyn for this        .All questions answered.  Diagnosis explained in detail, including differential.    No follow-ups on file.      Subjective    Chief Complaint   Patient presents with    Pelvic Pain       Enriqueta Dumont is a 89 y.o. No obstetric history on file. No LMP recorded. Patient has had a hysterectomy.   female who presents for evaluation of urinary frequency, more prolapse of the vagina.  Pt more confused than normal.        Past Medical History:   Diagnosis Date    Alzheimer's disease with late onset (CMS/HCC)     Duke's esophagus     Cervical stenosis of spine     Hemorrhoids     Hiatal hernia     High blood pressure     High cholesterol     Ovarian cyst     right    PTSD (post-traumatic stress disorder)     Schatzki's ring     Vertigo     Vitamin D deficiency        Past Surgical History:   Procedure Laterality Date    APPENDECTOMY      BLADDER SURGERY      prolapse    HAND SURGERY Right        Social History     Socioeconomic History    Marital status:      Spouse name: Not on file    Number of children: Not on file    Years of education: Not on file    Highest education level: Not on file    Occupational History    Not on file   Tobacco Use    Smoking status: Never     Passive exposure: Never    Smokeless tobacco: Never   Vaping Use    Vaping Use: Never used   Substance and Sexual Activity    Alcohol use: Not Currently    Drug use: Never    Sexual activity: Defer   Other Topics Concern    Not on file   Social History Narrative    Not on file     Social Determinants of Health     Financial Resource Strain: Not on file   Food Insecurity: Not on file   Transportation Needs: Not on file   Physical Activity: Not on file   Stress: Not on file   Social Connections: Not on file   Intimate Partner Violence: Not on file   Housing Stability: Not on file       Family History   Problem Relation Name Age of Onset    Heart disease Mother      Stroke Father      Cancer Sibling          Siblings       Prior to Admission medications    Medication Sig Start Date End Date Taking? Authorizing Provider   cholecalciferol (Vitamin D3) 50 mcg (2,000 unit) capsule 1 capsule Orally Once a day    Historical Provider, MD   dilTIAZem XR (DILT-XR) 180 mg 24 hr capsule TAKE 1 CAPSULE BY MOUTH EVERY DAY FOR 90 DAYS Orally Once a day    Historical Provider, MD   esomeprazole (NexIUM) 40 mg DR capsule Take 1 capsule (40 mg) by mouth once daily. 11/30/23   ELIA Null   gabapentin (Neurontin) 100 mg capsule TAKE 1 CAPSULE BY MOUTH TWICE A DAY 11/29/23   ELIA Null   hydroCHLOROthiazide (HYDRODiuril) 25 mg tablet TAKE 1 TABLET BY MOUTH EVERY DAY IN THE MORNING orally Daily    Historical Provider, MD   Lactobacillus acidophilus (PROBIOTIC ACIDOPHILUS ORAL) Probiotic    Historical Provider, MD   multivit-minerals/folic acid (ADULT MULTIVITAMIN GUMMIES ORAL) as directed Orally    Historical Provider, MD   ramipril (Altace) 10 mg capsule TAKE 1 CAPSULE BY MOUTH ONCE A DAY. ORALLY ONCE A DAY 90 DAYS ORALLY ONCE A DAY 90 DAYS 11/30/23   ELIA Null       Allergies   Allergen Reactions    Loratadine Other  "and Unknown     Tingling, arms, legs, jaw    Donepezil Hcl Hallucinations    Memantine Other    Other Unknown     Decongestants\"    Sulfamethoxazole-Trimethoprim Other     vomit              OBJECTIVE:   /90   Ht 1.483 m (4' 10.39\")   Wt 60 kg (132 lb 3.2 oz)   BMI 27.27 kg/m²   Body mass index is 27.27 kg/m².     Physical Exam  Vitals and nursing note reviewed.   Constitutional:       General: She is not in acute distress.  Cardiovascular:      Rate and Rhythm: Normal rate and regular rhythm.      Heart sounds: No murmur heard.     No friction rub. No gallop.   Pulmonary:      Effort: Pulmonary effort is normal.      Breath sounds: Normal breath sounds. No wheezing or rales.   Abdominal:      General: Bowel sounds are normal. There is no distension.      Palpations: Abdomen is soft. There is no mass.      Tenderness: There is no abdominal tenderness. There is no guarding or rebound.      Hernia: No hernia is present.   Genitourinary:     General: Normal vulva.      Labia:         Right: No rash or lesion.         Left: No rash or lesion.       Urethra: No urethral pain or urethral swelling.      Vagina: No erythema, tenderness, bleeding or lesions. Other prolapse of vaginal walls w/o mention of uterine prolapse: grade 3 midline cystocele..     Cervix: No cervical motion tenderness, discharge, friability, lesion, erythema, cervical bleeding or eversion.      Uterus: Normal. Not enlarged and not tender.       Adnexa:         Right: No mass, tenderness or fullness.          Left: No mass, tenderness or fullness.        Comments: Lichen sclerosis noted anterior and poster vulva, straignt cath obtained turbid urine.  Neurological:      Mental Status: She is alert.   Psychiatric:         Mood and Affect: Mood normal.         Behavior: Behavior normal.         Thought Content: Thought content normal.         Judgment: Judgment normal.             Note: This dictation was generated using Dragon voice recognition " software. Please excuse any grammatical or spelling errors that may have occurred using the system.

## 2024-02-17 LAB — BACTERIA UR CULT: ABNORMAL

## 2024-02-18 PROBLEM — N30.01 ACUTE CYSTITIS WITH HEMATURIA: Status: ACTIVE | Noted: 2024-02-18

## 2024-02-19 ENCOUNTER — APPOINTMENT (OUTPATIENT)
Dept: OBSTETRICS AND GYNECOLOGY | Facility: CLINIC | Age: 89
End: 2024-02-19
Payer: MEDICARE

## 2024-02-19 NOTE — ASSESSMENT & PLAN NOTE
Due to dimentia, recommend against pessary,  consider colpoclesis, would need referral to urogyn for this

## 2024-02-22 ENCOUNTER — TELEPHONE (OUTPATIENT)
Dept: OBSTETRICS AND GYNECOLOGY | Facility: CLINIC | Age: 89
End: 2024-02-22
Payer: MEDICARE

## 2024-02-22 DIAGNOSIS — N30.01 ACUTE CYSTITIS WITH HEMATURIA: Primary | ICD-10-CM

## 2024-02-22 NOTE — TELEPHONE ENCOUNTER
Giulia(pt daughter) called and states she did an azo test strip on her moms urine and it showed nitrates. She was in on the 14th and was positive for a UTI. Daughter states antibiotics did not relieve symptoms. Can something else be sent or does she need to come in for another urine culture?

## 2024-02-23 ENCOUNTER — LAB (OUTPATIENT)
Dept: LAB | Facility: LAB | Age: 89
End: 2024-02-23
Payer: MEDICARE

## 2024-02-23 DIAGNOSIS — N30.01 ACUTE CYSTITIS WITH HEMATURIA: ICD-10-CM

## 2024-02-23 LAB
APPEARANCE UR: CLEAR
BILIRUB UR STRIP.AUTO-MCNC: NEGATIVE MG/DL
COLOR UR: YELLOW
GLUCOSE UR STRIP.AUTO-MCNC: NORMAL MG/DL
KETONES UR STRIP.AUTO-MCNC: NEGATIVE MG/DL
LEUKOCYTE ESTERASE UR QL STRIP.AUTO: ABNORMAL
NITRITE UR QL STRIP.AUTO: NEGATIVE
PH UR STRIP.AUTO: 5.5 [PH]
PROT UR STRIP.AUTO-MCNC: ABNORMAL MG/DL
RBC # UR STRIP.AUTO: NEGATIVE /UL
RBC #/AREA URNS AUTO: NORMAL /HPF
SP GR UR STRIP.AUTO: 1.02
UROBILINOGEN UR STRIP.AUTO-MCNC: NORMAL MG/DL
WBC #/AREA URNS AUTO: NORMAL /HPF

## 2024-02-23 PROCEDURE — 87086 URINE CULTURE/COLONY COUNT: CPT

## 2024-02-23 PROCEDURE — 81001 URINALYSIS AUTO W/SCOPE: CPT

## 2024-02-23 PROCEDURE — 87186 SC STD MICRODIL/AGAR DIL: CPT

## 2024-02-27 ENCOUNTER — TELEPHONE (OUTPATIENT)
Dept: OBSTETRICS AND GYNECOLOGY | Facility: CLINIC | Age: 89
End: 2024-02-27
Payer: MEDICARE

## 2024-02-27 DIAGNOSIS — N30.01 ACUTE CYSTITIS WITH HEMATURIA: Primary | ICD-10-CM

## 2024-02-27 LAB
BACTERIA UR CULT: ABNORMAL
BACTERIA UR CULT: ABNORMAL

## 2024-02-27 RX ORDER — AMPICILLIN 500 MG/1
500 CAPSULE ORAL EVERY 6 HOURS SCHEDULED
Qty: 28 CAPSULE | Refills: 0 | Status: SHIPPED | OUTPATIENT
Start: 2024-02-27 | End: 2024-03-05

## 2024-02-27 RX ORDER — NITROFURANTOIN 25; 75 MG/1; MG/1
100 CAPSULE ORAL 2 TIMES DAILY
Qty: 10 CAPSULE | Refills: 0 | Status: SHIPPED | OUTPATIENT
Start: 2024-02-27 | End: 2024-03-03

## 2024-03-01 ENCOUNTER — TELEPHONE (OUTPATIENT)
Dept: OBSTETRICS AND GYNECOLOGY | Facility: CLINIC | Age: 89
End: 2024-03-01
Payer: MEDICARE

## 2024-03-01 NOTE — TELEPHONE ENCOUNTER
PT DAUGHTER STATES THAT MACROBID IS MAKING HER MOTHER VERY SLEEPY. SHE'S SLEEP FOR HOURS. STATES MOTHER IS MORE CONFUSED WHILE ON MACROBID.

## 2024-03-01 NOTE — TELEPHONE ENCOUNTER
Lets stop the macrobid and just use the ampicillin. The sensitivity showed it should work for both bacteria involved.  If this doesn't resolve, then she will need to see a urologist.

## 2024-03-07 NOTE — TELEPHONE ENCOUNTER
Pt Daughter Giulia called again, she said they did an at home UTI test and it is still positive, she is still taking the ampicillin, but now she is complaining of back pain and  she is tearful. Gave her phone number for urology, and advised her I would ask you what she should do in the meantime until they can see urology

## 2024-03-13 ENCOUNTER — TELEPHONE (OUTPATIENT)
Dept: PRIMARY CARE | Facility: CLINIC | Age: 89
End: 2024-03-13
Payer: MEDICARE

## 2024-03-13 NOTE — TELEPHONE ENCOUNTER
Phoned Giulia in follow up, she reported she will drop the paperwork to the [facility] office tomorrow.   Patient will not be in the [facility] so the provider's at the facility will follow.

## 2024-03-13 NOTE — TELEPHONE ENCOUNTER
Giulia/ daughter phoned office and reported her mother is moving into [facility] in [place] on 4/7/24 and she is going to drop off paperwork at the office for you to complete. She also asked if you could complete DNR paperwork for her mother, she does not believe this has ever been put in place.

## 2024-03-19 ENCOUNTER — OFFICE VISIT (OUTPATIENT)
Dept: OBSTETRICS AND GYNECOLOGY | Facility: CLINIC | Age: 89
End: 2024-03-19
Payer: MEDICARE

## 2024-03-19 VITALS
SYSTOLIC BLOOD PRESSURE: 120 MMHG | BODY MASS INDEX: 26.81 KG/M2 | DIASTOLIC BLOOD PRESSURE: 66 MMHG | WEIGHT: 133 LBS | HEIGHT: 59 IN

## 2024-03-19 DIAGNOSIS — N39.0 RECURRENT UTI: Primary | ICD-10-CM

## 2024-03-19 PROCEDURE — 1160F RVW MEDS BY RX/DR IN RCRD: CPT | Performed by: OBSTETRICS & GYNECOLOGY

## 2024-03-19 PROCEDURE — 1036F TOBACCO NON-USER: CPT | Performed by: OBSTETRICS & GYNECOLOGY

## 2024-03-19 PROCEDURE — 3074F SYST BP LT 130 MM HG: CPT | Performed by: OBSTETRICS & GYNECOLOGY

## 2024-03-19 PROCEDURE — 1159F MED LIST DOCD IN RCRD: CPT | Performed by: OBSTETRICS & GYNECOLOGY

## 2024-03-19 PROCEDURE — 99204 OFFICE O/P NEW MOD 45 MIN: CPT | Performed by: OBSTETRICS & GYNECOLOGY

## 2024-03-19 PROCEDURE — 3078F DIAST BP <80 MM HG: CPT | Performed by: OBSTETRICS & GYNECOLOGY

## 2024-03-19 RX ORDER — ESTRADIOL 0.1 MG/G
0.5 CREAM VAGINAL DAILY
Qty: 42.5 G | Refills: 3 | Status: SHIPPED | OUTPATIENT
Start: 2024-03-19 | End: 2025-03-19

## 2024-03-19 ASSESSMENT — ENCOUNTER SYMPTOMS
CONSTITUTIONAL NEGATIVE: 1
CONSTIPATION: 1
FREQUENCY: 1
RESPIRATORY NEGATIVE: 1
EYES NEGATIVE: 1
NEUROLOGICAL NEGATIVE: 1
MUSCULOSKELETAL NEGATIVE: 1
ENDOCRINE NEGATIVE: 1
PSYCHIATRIC NEGATIVE: 1
CARDIOVASCULAR NEGATIVE: 1

## 2024-03-19 NOTE — PROGRESS NOTES
[facility] Department of Urogynecology   Keshia Huynh MD, MPH   [phone]    ASSESSMENT AND PLAN:   89 year old female with Nicole, OAB/UUI, constipation, LS, and vaginal atrophy. Comorbidities include: Late onset Alzheimer's disease and HTN. She is planning to move into an assisted living facility in 2 weeks.     Diagnoses:  #1 Recurrent UTI  #2 Vaginal atrophy   #3 Constipation   #4 Overactive bladder    Plan:  1. Nicole, vaginal atrophy   - x3 UTI within the past year and her symptoms are difficult to identify due to her confusion/AMS.   - Reviewed true symptoms of a UTI such as burning with urination, urinary urgency/frequency, and bladder pressure/pain and we recommend leaving a urine sample when she endorses these symptoms.   - Discussed that utilizing tv estrogen acts as UTI prophylaxis in postmenopausal women by making the vaginal pH more acidic and providing more blood flow to vaginal tissue/changes the vasculature therefore preventing bacteria from colonizing. We reassured her that using tv estrogen cream has a localized effect to the vaginal tissue and is not a form of HRT such as po estrogen which produces a systemic effect. The risk of using vaginal estrogen cream in correlation with putting patients at greater risk for developing breast/endometrial cancer is extremely low as the estrogen acts locally and is not absorbed systemically.   - Counseled her to use the loading dose of E2 nightly for 2 weeks and then switch to the maintenance dose of using it 2x/week thereafter.   - Sent Rx Estradiol cream to her preferred pharmacy.   - Encouraged her to call our office and leave a urine sample at any  lab when she feels symptomatic of a UTI in the future so we can result it and treat her accordingly as overuse of antibiotics this puts the patient at risk for developing antibiotic-resistant infections. Will plan to only sent antibiotics for positive cultures and when she is symptomatic of a UTI.      2. Constipation  - Reviewed that constipation can worsen OAB symptoms.   - We encouraged her to start daily fiber supplement and PRN MiraLAX to help prevent constipation. The goal is to have a soft/easy to pass BM at least 3x/week.     3. OAB, UUI, nocturia  - Plan to work on constipation to see if OAB symptoms improve.     4. Cystocele, stage 3  - We discussed that POP treatments are based on degree of bother and impact to the patient quality of life. We specifically provided reassurance that prolapse is a quality of life issue, is not cancerous, and that POP intervention is only necessary when she is bothered by this or if it is causing retention issues.   - Reviewed that her back pain is more than likely not related to her prolapse as POP symptoms include a vaginal bulge and urinary urgency.   - Reviewed risk factors, natural history and etiology of prolapse.   - Discussed management options for prolapse including expectant management, PFPT, pessary fitting, and surgery (I.e. Colpocleisis).    - We discussed that PFPT would help strengthen the PF muscles with an overall goal to help improve the symptoms of the vaginal bulge and prevent POP from worsening over time.   - We discussed the benefits of fitting her with a pessary which is a small flexible silicone ring that is placed intravaginally to help support the pelvic organs to prevent the symptomatic vaginal bulge. She may learn how to manage the pessary at home on her own with taking the pessary in/out and recommended maintenance at least every 2 weeks or she may return to clinic every 3 months for us to perform her pessary maintenance. However, we discussed that the pessary cannot be left in for longer than 3 months at a time without maintenance as this puts patients at risk for the pessary eroding into the bowel/bladder.   - Will consider a pessary fitting but will defer POP management at this time given that she is not bothered by prolapse at this time.      Follow up in 1 month via virtual visit with Dr. Keshia Huynh.     Scribe Attestation  By signing my name below, I, Henry Jose, Luis, attest that this documentation has been prepared under the direction and in the presence of Keshia Huynh MD MPH on 03/19/2024 at 7:01 PM.     Problem List Items Addressed This Visit    None  Visit Diagnoses       Recurrent UTI    -  Primary    Relevant Medications    estradiol (Estrace) 0.01 % (0.1 mg/gram) vaginal cream           I spent a total of 45 minutes in face to face and non face to face time.      Keshia Huynh MD, MPH, FACOG     I Dr. Huynh, personally performed the services described in the documentation as scribed in my presence and confirm it is both complete and accurate.  Keshia Huynh MD, MPH, FACOG      New    HISTORY OF PRESENT ILLNESS:   89 year old female presenting as a new patient referral for recurrent UTI, lichen sclerosus, and a midline cystocele. She presents today with her daughter, Giulia.     Record Review:   - She sees Dr. Aguilar who has been tx her LS with Triamcinolone cream. She treated her on 2/14/2024 for a UTI with Macrobid x5 days and was then switched to Ampicillin. Patient with prolapsed bladder and UTI 3x since September 2023.   - 2/23/2024 Urine culture: >100,000 Escherichia coli and >100,000 GBS. Pan sensitive.   - 2/14/2024 Urine culture: >100,000 Klebsiella pneumoniae/variicola. Resistant to Ampicillin.   - 10/2/2023 Urine culture: >100,000 Escherichia coli> Vaz sensitive.   - 9/14/2023 Urine culture: >100,000 Escherichia coli. Pan sensitive.     Prolapse Symptoms:  - She reports occasionally feeling a vaginal bulge but is not particularly bothered by this.      Urinary Symptoms:   - Hx of 3 UTI since September 2023.   - She is asymptomatic when she has a UTI. Her daughter notes that Enriqueta will complain more of back pain and pelvic/bladder pressure.   - Does wear Depends at night and is occasionally wet from urine  saturation.   - Nocturia 2-3x per night with bedside commode.   - Reports occasional UUI on her way to the bathroom in the evenings when she waits too long or does not listen to the first urge to void. She does not have to wear pads during the day as urine leakage during the day is not occurring.     Bowel Symptoms:   - No FI/ABL episodes.  - Has a BM once daily and takes probiotics to help with constipation.     OBGYN History and Sexual Activity:   - , x7   - History of episiotomies in the past and is unsure if she had OASI.   - Postmenopausal s/p hysterectomy    Social History:  - She lives alone but is planning to move into an assisted living in x2 weeks. She will be living in an apartment on her own but she will have help with meals and activities.       Past Medical History:     Past Medical History:   Diagnosis Date    Alzheimer's disease with late onset (CMS/HCC)     Duke's esophagus     Cervical stenosis of spine     Hemorrhoids     Hiatal hernia     High blood pressure     High cholesterol     Ovarian cyst     right    PTSD (post-traumatic stress disorder)     Schatzki's ring     Vertigo     Vitamin D deficiency         Past Surgical History:     Past Surgical History:   Procedure Laterality Date    APPENDECTOMY      BLADDER SURGERY      prolapse    HAND SURGERY Right        Medications:     Prior to Admission medications    Medication Sig Start Date End Date Taking? Authorizing Provider   cholecalciferol (Vitamin D3) 50 mcg (2,000 unit) capsule 1 capsule Orally Once a day    Historical Provider, MD   dilTIAZem XR (DILT-XR) 180 mg 24 hr capsule TAKE 1 CAPSULE BY MOUTH EVERY DAY FOR 90 DAYS Orally Once a day    Historical Provider, MD   esomeprazole (NexIUM) 40 mg DR capsule Take 1 capsule (40 mg) by mouth once daily. 23   BUNNY Null-CNP   estradiol (Estrace) 0.01 % (0.1 mg/gram) vaginal cream Insert 0.125 Applicatorfuls (0.5 g) into the vagina once daily. Nightly for 2  "weeks, then twice weekly. 3/19/24 3/19/25  Keshia Huynh MD MPH   gabapentin (Neurontin) 100 mg capsule TAKE 1 CAPSULE BY MOUTH TWICE A DAY 11/29/23   ELIA Null   hydroCHLOROthiazide (HYDRODiuril) 25 mg tablet TAKE 1 TABLET BY MOUTH EVERY DAY IN THE MORNING orally Daily    Historical Provider, MD   Lactobacillus acidophilus (PROBIOTIC ACIDOPHILUS ORAL) Probiotic    Historical Provider, MD   multivit-minerals/folic acid (ADULT MULTIVITAMIN GUMMIES ORAL) as directed Orally    Historical Provider, MD   ramipril (Altace) 10 mg capsule TAKE 1 CAPSULE BY MOUTH ONCE A DAY. ORALLY ONCE A DAY 90 DAYS ORALLY ONCE A DAY 90 DAYS 11/30/23   ELIA Null   triamcinolone (Kenalog) 0.5 % cream Apply topically 3 times a day. 2/14/24 5/14/24  DO TANJA Osborn  Review of Systems   Constitutional: Negative.    HENT: Negative.     Eyes: Negative.    Respiratory: Negative.     Cardiovascular: Negative.    Gastrointestinal:  Positive for constipation.   Endocrine: Negative.    Genitourinary:  Positive for enuresis, frequency and urgency.   Musculoskeletal: Negative.    Neurological: Negative.    Psychiatric/Behavioral: Negative.            PHYSICAL EXAM:    /66   Ht 1.499 m (4' 11\")   Wt 60.3 kg (133 lb)   BMI 26.86 kg/m²   No LMP recorded. Patient has had a hysterectomy.    Declines chaperone for physical exam.      Well developed, well nourished, in no apparent distress.   Neurologic/Psychiatric:  Awake, Alert and Oriented times 3.  Affect normal.     GENITAL/URINARY:     External Genitalia:  The patient has normal appearing external genitalia, normal skenes and bartholins glands, and a normal hair distribution.  Her vulva is without lesions, erythema or discharge.  It is non-tender with appropriate sensation.     Urethral Meatus:  Size normal, Location normal, Lesions absent, Prolapse absent.    Urethra:  Fullness absent, Masses absent.    Bladder:  Fullness absent, Masses absent, " Tenderness absent.    Vagina:  General appearance normal, Discharge absent, Lesions absent. Normal vaginal epithelium, hypoestrogenic.     Cervix: surgically absent  Uterus:  surgically absent    Anus/Perineum:  Normal perineum.     Stress urinary incontinence not demonstrable.       Physical Exam  Genitourinary:      Bladder and urethral meatus normal.      Vaginal cuff intact.     Anterior vaginal prolapse present.     Mild vaginal atrophy present.     Cervix is absent.      Uterus is absent.      No urethral tenderness or mass present.   POP-Q measurements were:      Aa: +1, Ba: +1, C: -5     gH: 5, pB: 4, TVL: 10     Ap: Bp: D: X     Pelvic exam was performed with patient in the lithotomy position.     Pelvic exam limited by patient discomfort.     Rectal exam: Deferred.       Data and DIAGNOSTIC STUDIES REVIEWED   Lab Results   Component Value Date    URINECULTURE >100,000 Escherichia coli (A) 02/23/2024    URINECULTURE (A) 02/23/2024     >100,000 Streptococcus agalactiae (Group B Streptococcus)      Lab Results   Component Value Date    GLUCOSE 159 (H) 04/26/2023    CALCIUM 9.7 04/26/2023     04/26/2023    K 4.1 04/26/2023    CO2 23 (L) 04/26/2023    CL 98 04/26/2023    BUN 28 (H) 04/26/2023    CREATININE 1.0 04/26/2023     Lab Results   Component Value Date    WBC 7.6 04/26/2023    HGB 12.2 04/26/2023    HCT 37.9 04/26/2023    MCV 88.3 04/26/2023     04/26/2023

## 2024-03-19 NOTE — PATIENT INSTRUCTIONS
Fiber Therapy:    Fiber acts in the colon (large bowel) to make the stool soft.  If the stool is too hard, the fiber draws water in and makes it softer.  If the stool is too watery, it acts like a 'sponge' and soaks up the liquid.     Many foods contain fiber. Fruits, vegetables, whole grains, and high fiber cereals all contain some fiber. Unfortunately, most of us don’t eat enough and a fiber supplement is a good way to increase soluble fiber intake.     Supplemental fiber comes in many forms. You could choose from:    1.   Powder  2.   Tablets  3.   Wafers/Cookies/Gummies    Some common brands include:    1.   Benefiber (tasteless powder)  2.   Metamucil (powder)  3.   Konsyl (powder)  4.   Citrucel (powder, may cause less gas)  5.   Fiber-Con (tablet)    All of these products work in the same way, but some may work better than others for you.    Dosin.   One tablespoon of powder dissolves in 8-16 oz of water or juice OR  2.   Two tablets with 8-16 oz water or juice OR  3.   Two fiber wafers/cookies with 8-16 oz of water or juice    Take fiber in the morning. Start off using it once per day. You can then increase frequency if needed.    IF FIBER IS NOT TAKEN WITH ADEQUATE WATER/FLUID INTAKE, IT MAY BE CONSTIPATING.  DRINK 6-8 GLASSES OF WATER/LIQUIDS THROUGHOUT THE DAY WHILE TAKING FIBER SUPPLEMENTATION.    Fiber can cause gas/bloating, especially when first starting the treatment.  If this is the case, you can take simethicone (Gas-X) over the counter after meals and at bedtime as needed.    Summary:  1.  Remember: the most common cause of constipation is inadequate water intake during the day. Avoiding dehydration is usually the key to success with fiber supplementation.  2. Using fiber requires some experimentation- if one brand doesn’t work or causes excessive gas, take another. Also, try varying the form of fiber- for example, if the powder is unpalatable; take the tablets or wafers instead.  3. You  may need more than one dose per day- feel free to increase your dose to morning and mid-day if that works better.  4. Results depend on consistency of usage- the more consistent you are in taking fiber, the better the results!

## 2024-03-21 ENCOUNTER — TELEPHONE (OUTPATIENT)
Dept: OBSTETRICS AND GYNECOLOGY | Facility: CLINIC | Age: 89
End: 2024-03-21
Payer: MEDICARE

## 2024-03-21 NOTE — TELEPHONE ENCOUNTER
Copied from CRM #[id number]. Topic: Information Request - Prescription Refill FAQ  >> Mar 21, 2024 12:22 PM Mabel DALAL wrote:  Ms. Giulia Acosta is requesting a call back regarding her mother's prescription not being sent over the the pharmacy for estrogen cream.

## 2024-04-05 ENCOUNTER — TELEPHONE (OUTPATIENT)
Dept: PRIMARY CARE | Facility: CLINIC | Age: 89
End: 2024-04-05
Payer: MEDICARE

## 2024-04-05 NOTE — TELEPHONE ENCOUNTER
Giulia states medications need updated as they are giving her hydrochlorothiazide ever three days instead of daily. Additionally gabapentin is ordered three times a day and they are giving twice daily.

## 2024-04-16 ENCOUNTER — TELEPHONE (OUTPATIENT)
Dept: PRIMARY CARE | Facility: CLINIC | Age: 89
End: 2024-04-16

## 2024-04-16 ENCOUNTER — APPOINTMENT (OUTPATIENT)
Dept: OBSTETRICS AND GYNECOLOGY | Facility: CLINIC | Age: 89
End: 2024-04-16
Payer: MEDICARE

## 2024-04-16 NOTE — TELEPHONE ENCOUNTER
Patient resides at [facility], [facility] will not be seeing patients at this facility at this time.  Patient has appointment scheduled w/ Cathy Meza NP 4/24/24.

## 2024-04-17 ENCOUNTER — TELEPHONE (OUTPATIENT)
Dept: PRIMARY CARE | Facility: CLINIC | Age: 89
End: 2024-04-17
Payer: MEDICARE

## 2024-04-17 DIAGNOSIS — M54.50 CHRONIC BILATERAL LOW BACK PAIN WITHOUT SCIATICA: Primary | ICD-10-CM

## 2024-04-17 DIAGNOSIS — G89.29 CHRONIC BILATERAL LOW BACK PAIN WITHOUT SCIATICA: Primary | ICD-10-CM

## 2024-04-17 NOTE — TELEPHONE ENCOUNTER
Received fax from [facility] reporting that patient had an unwitnessed fall in her room. There was no signs of injury. No complaints of pain or discomfort. No visible injuries. She is also more anxious than her baseline. Will see patient 4/24 as scheduled.

## 2024-04-17 NOTE — TELEPHONE ENCOUNTER
Clarified with facility and patients are allowed to be seen by their own provider in facility. Appt scheduled 04/24/2024 will be kept at this time. LMOM for daughter Giulia.

## 2024-04-18 ENCOUNTER — TELEMEDICINE (OUTPATIENT)
Dept: OBSTETRICS AND GYNECOLOGY | Facility: CLINIC | Age: 89
End: 2024-04-18
Payer: MEDICARE

## 2024-04-18 DIAGNOSIS — K59.00 CONSTIPATION, UNSPECIFIED CONSTIPATION TYPE: ICD-10-CM

## 2024-04-18 DIAGNOSIS — N32.81 OAB (OVERACTIVE BLADDER): ICD-10-CM

## 2024-04-18 DIAGNOSIS — N95.2 VAGINAL ATROPHY: Primary | ICD-10-CM

## 2024-04-18 PROCEDURE — 1159F MED LIST DOCD IN RCRD: CPT | Performed by: OBSTETRICS & GYNECOLOGY

## 2024-04-18 PROCEDURE — 1160F RVW MEDS BY RX/DR IN RCRD: CPT | Performed by: OBSTETRICS & GYNECOLOGY

## 2024-04-18 PROCEDURE — 99214 OFFICE O/P EST MOD 30 MIN: CPT | Performed by: OBSTETRICS & GYNECOLOGY

## 2024-04-18 RX ORDER — IBUPROFEN 200 MG
600 TABLET ORAL 3 TIMES DAILY PRN
Start: 2024-04-18

## 2024-04-18 ASSESSMENT — ENCOUNTER SYMPTOMS
GASTROINTESTINAL NEGATIVE: 1
EYES NEGATIVE: 1
ENDOCRINE NEGATIVE: 1
RESPIRATORY NEGATIVE: 1
MUSCULOSKELETAL NEGATIVE: 1
PSYCHIATRIC NEGATIVE: 1
CARDIOVASCULAR NEGATIVE: 1
CONSTITUTIONAL NEGATIVE: 1
NEUROLOGICAL NEGATIVE: 1

## 2024-04-19 NOTE — PROGRESS NOTES
[facility] Department of Urogynecology   Keshia Huynh MD, MPH   [phone]    ASSESSMENT AND PLAN:   89 year old female with Nicole, OAB/UUI, constipation, LS, and vaginal atrophy. Comorbidities include: Late onset Alzheimer's disease and HTN. She now resides in an assisted living facility.     Diagnoses:  #1 Recurrent UTI  #2 Vaginal atrophy   #3 Constipation   #4 Overactive bladder    Plan:  1. Nicole, vaginal atrophy   - No recent UTI within the last x1 month.   - We encouraged her to continue using tv estrogen cream 2x/week for UTI ppx.   - Counseled to call our office and leave a urine sample at any  lab when she feels symptomatic of a UTI in the future so we can result it and treat her accordingly as overuse of antibiotics this puts the patient at risk for developing antibiotic-resistant infections. Will plan to only sent antibiotics for positive cultures and when she is symptomatic of a UTI.     2. Constipation   - Reviewed that constipation can worsen OAB symptoms.   - We encouraged her to continue daily fiber supplement and PRN MiraLAX to help prevent constipation. The goal is to have a soft/easy to pass BM at least 3x/week.      3. OAB, UUI, nocturia  - Plan to continue bowel regimen to reduce constipation to see if OAB symptoms improve.     Follow up in 1 year with BUNNY Bagley-CNP for Nicole.     Virtual visit today: The patient's identity was confirmed and that she is located in [place].   Keshia Huynh MD MPH identified herself and the patient consented to a telehealth visit today.     Scribe Attestation  By signing my name below, Henry SHERMAN Scribe, attest that this documentation has been prepared under the direction and in the presence of Keshia Huynh MD MPH on 04/18/2024 at 9:11 PM.          Keshia Huynh MD, MPH, FACOG   I Dr. Huynh, personally performed the services described in the documentation as scribed in my presence and confirm it is both complete and  accurate.  Keshia Huynh MD, MPH, FACOG      Established    HISTORY OF PRESENT ILLNESS:   89 year old female presenting in virtual follow up for Nicole.     Record Review:   - 3/19/2024 Dr. Keshia Huynh note RE: Nicole - Started her on E2 for UTI ppx and advised her to leave a urine sample when she feels symptomatic of a UTI in the future. Constipation - We encouraged her to start daily fiber supplement and PRN MiraLAX. OAB - Plan to work on constipation to see if OAB symptoms improve. Stage 3 cystocele - Reviewed colpocleisis vs. Pessary but she will defer POP management at this time she is not bothered by prolapse.   - She sees Dr. Aguilar who has been tx her LS with Triamcinolone cream. She treated her on 2/14/2024 for a UTI with Macrobid x5 days and was then switched to Ampicillin. Patient with prolapsed bladder and UTI 3x since September 2023.   - 2/23/2024 Urine culture: >100,000 Escherichia coli and >100,000 GBS. Pan sensitive.   - 2/14/2024 Urine culture: >100,000 Klebsiella pneumoniae/variicola. Resistant to Ampicillin.   - 10/2/2023 Urine culture: >100,000 Escherichia coli> Vaz sensitive.   - 9/14/2023 Urine culture: >100,000 Escherichia coli. Pan sensitive.     Urinary Symptoms:   - No recent UTI within the last month.   - The patient has been using tv estrogen cream 2x/week for UTI ppx. Of note, prior to starting the E2 she had been experiencing soreness in her vagina that is now resolved.     Bowel Symptoms:   - She has not been experiencing constipation.     Social History:   - She has moved into an assisted living where she lives in an apartment on her own but has help with meals and activities.        Past Medical History:     Past Medical History:   Diagnosis Date    Alzheimer's disease with late onset (Multi)     [facility]'s esophagus     Cervical stenosis of spine     Hemorrhoids     Hiatal hernia     High blood pressure     High cholesterol     Ovarian cyst     right    PTSD (post-traumatic stress  disorder)     Schatzki's ring     Vertigo     Vitamin D deficiency        Past Surgical History:     Past Surgical History:   Procedure Laterality Date    APPENDECTOMY      BLADDER SURGERY      prolapse    HAND SURGERY Right 2020       Medications:     Prior to Admission medications    Medication Sig Start Date End Date Taking? Authorizing Provider   cholecalciferol (Vitamin D3) 50 mcg (2,000 unit) capsule 1 capsule Orally Once a day    Historical Provider, MD   dilTIAZem XR (DILT-XR) 180 mg 24 hr capsule TAKE 1 CAPSULE BY MOUTH EVERY DAY FOR 90 DAYS Orally Once a day    Historical Provider, MD   esomeprazole (NexIUM) 40 mg DR capsule Take 1 capsule (40 mg) by mouth once daily. 11/30/23   ELIA Null   estradiol (Estrace) 0.01 % (0.1 mg/gram) vaginal cream Insert 0.125 Applicatorfuls (0.5 g) into the vagina once daily. Nightly for 2 weeks, then twice weekly. 3/19/24 3/19/25  Keshia Huynh MD MPH   gabapentin (Neurontin) 100 mg capsule TAKE 1 CAPSULE BY MOUTH TWICE A DAY 11/29/23   ELIA Null   hydroCHLOROthiazide (HYDRODiuril) 25 mg tablet Take 1 tablet (25 mg) by mouth every 3 days. POA is giving medication every 3 days    Historical Provider, MD   ibuprofen (AdviL) 200 mg tablet Take 3 tablets (600 mg) by mouth 3 times a day as needed for mild pain (1 - 3). 4/18/24   ELIA Null   Lactobacillus acidophilus (PROBIOTIC ACIDOPHILUS ORAL) Probiotic    Historical Provider, MD   multivit-minerals/folic acid (ADULT MULTIVITAMIN GUMMIES ORAL) as directed Orally    Historical Provider, MD   ramipril (Altace) 10 mg capsule TAKE 1 CAPSULE BY MOUTH ONCE A DAY. ORALLY ONCE A DAY 90 DAYS ORALLY ONCE A DAY 90 DAYS 11/30/23   ELIA Null   triamcinolone (Kenalog) 0.5 % cream Apply topically 3 times a day. 2/14/24 5/14/24  DO TANJA Osborn  Review of Systems   Constitutional: Negative.    HENT: Negative.     Eyes: Negative.    Respiratory: Negative.      Cardiovascular: Negative.    Gastrointestinal: Negative.    Endocrine: Negative.    Genitourinary: Negative.    Musculoskeletal: Negative.    Neurological: Negative.    Psychiatric/Behavioral: Negative.              Data and DIAGNOSTIC STUDIES REVIEWED   Lab Results   Component Value Date    URINECULTURE >100,000 Escherichia coli (A) 02/23/2024    URINECULTURE (A) 02/23/2024     >100,000 Streptococcus agalactiae (Group B Streptococcus)      Lab Results   Component Value Date    GLUCOSE 159 (H) 04/26/2023    CALCIUM 9.7 04/26/2023     04/26/2023    K 4.1 04/26/2023    CO2 23 (L) 04/26/2023    CL 98 04/26/2023    BUN 28 (H) 04/26/2023    CREATININE 1.0 04/26/2023     Lab Results   Component Value Date    WBC 7.6 04/26/2023    HGB 12.2 04/26/2023    HCT 37.9 04/26/2023    MCV 88.3 04/26/2023     04/26/2023

## 2024-04-22 ENCOUNTER — TELEPHONE (OUTPATIENT)
Dept: PRIMARY CARE | Facility: CLINIC | Age: 89
End: 2024-04-22
Payer: MEDICARE

## 2024-04-22 NOTE — TELEPHONE ENCOUNTER
Giulia calls to report she has cancelled patient's appointment for tomorrow as her sister has decided to go with a provider at the facility.  Giulia thanks [facility] for the work put in on behalf of the patient's health and family paperwork.

## 2024-04-24 ENCOUNTER — APPOINTMENT (OUTPATIENT)
Dept: PRIMARY CARE | Facility: CLINIC | Age: 89
End: 2024-04-24
Payer: MEDICARE

## 2024-04-25 ENCOUNTER — APPOINTMENT (OUTPATIENT)
Dept: PRIMARY CARE | Facility: CLINIC | Age: 89
End: 2024-04-25
Payer: MEDICARE

## 2024-06-03 DIAGNOSIS — I10 ESSENTIAL (PRIMARY) HYPERTENSION: ICD-10-CM

## 2024-06-03 NOTE — TELEPHONE ENCOUNTER
Cathy pt, pharmacy asking for refill but we are no longer following pt. Can you refuse refill? Thank you

## 2024-06-04 RX ORDER — DILTIAZEM HYDROCHLORIDE 180 MG/1
180 CAPSULE, EXTENDED RELEASE ORAL DAILY
Qty: 90 CAPSULE | Refills: 3 | Status: SHIPPED | OUTPATIENT
Start: 2024-06-04

## 2024-11-15 ENCOUNTER — TELEPHONE (OUTPATIENT)
Dept: OBSTETRICS AND GYNECOLOGY | Facility: CLINIC | Age: 89
End: 2024-11-15
Payer: MEDICARE

## 2024-11-15 DIAGNOSIS — N90.4 LICHEN SCLEROSUS ET ATROPHICUS OF THE VULVA: Primary | ICD-10-CM

## 2024-11-16 RX ORDER — TRIAMCINOLONE ACETONIDE 5 MG/G
CREAM TOPICAL 3 TIMES DAILY
Qty: 454 G | Refills: 1 | Status: SHIPPED | OUTPATIENT
Start: 2024-11-16 | End: 2025-02-14

## 2025-02-06 ENCOUNTER — APPOINTMENT (OUTPATIENT)
Dept: OTOLARYNGOLOGY | Facility: CLINIC | Age: OVER 89
End: 2025-02-06
Payer: MEDICARE

## 2025-03-05 ENCOUNTER — APPOINTMENT (OUTPATIENT)
Dept: OTOLARYNGOLOGY | Facility: CLINIC | Age: OVER 89
End: 2025-03-05
Payer: MEDICARE

## 2025-04-22 ENCOUNTER — APPOINTMENT (OUTPATIENT)
Dept: OTOLARYNGOLOGY | Facility: CLINIC | Age: OVER 89
End: 2025-04-22
Payer: MEDICARE

## 2025-04-22 VITALS — BODY MASS INDEX: 27.01 KG/M2 | WEIGHT: 134 LBS | HEIGHT: 59 IN | TEMPERATURE: 97.4 F

## 2025-04-22 DIAGNOSIS — H61.23 BILATERAL IMPACTED CERUMEN: Primary | ICD-10-CM

## 2025-04-22 DIAGNOSIS — H91.93 BILATERAL HEARING LOSS, UNSPECIFIED HEARING LOSS TYPE: ICD-10-CM

## 2025-04-22 PROCEDURE — 1036F TOBACCO NON-USER: CPT

## 2025-04-22 PROCEDURE — 99203 OFFICE O/P NEW LOW 30 MIN: CPT

## 2025-04-22 PROCEDURE — 69210 REMOVE IMPACTED EAR WAX UNI: CPT

## 2025-04-22 PROCEDURE — 1159F MED LIST DOCD IN RCRD: CPT

## 2025-04-22 NOTE — PROGRESS NOTES
"Chief Complaint   Patient presents with    New Patient Visit     HAD A BIOPSY ON THE LT. EAR STILL HURTS TO THE TOUCH, EAR CLEANING     HPI:  Enriqueta Dumont is a 89 y.o. female presents with daughter Giulia referred by audiology for ear cleaning.  Giulia reports a history of dry impacted earwax.  Enriqueta does wear hearing aids but has recently lost the right it is on order.    PMH:  Medical History[1]  Surgical History[2]      Medications:   Current Medications[3]     Allergies:  Allergies[4]     ROS:  Review of systems normal unless stated otherwise in the HPI and/or PMH.    Physical Exam:  Temperature 36.3 °C (97.4 °F), height 1.499 m (4' 11\"), weight 60.8 kg (134 lb). Body mass index is 27.06 kg/m².     GENERAL APPEARANCE: Well developed and well nourished.  Alert and oriented in no acute distress.  Normal vocal quality.      HEAD/FACE: No erythema or edema or facial tenderness.  Normal facial nerve function bilaterally.    EAR:       EXTERNAL: Bilateral external auditory canals noted to have cerumen impactions which caused obstruction of visualization of the tympanic membranes.  These were removed via procedure after softening with mineral oil under direct microscopic visualization using instrumentation without any difficulty or complication.  Normal pinnas and external auditory canals without lesion.       MIDDLE EAR: Tympanic membranes intact and mobile with normal landmarks.  Middle ear space appears well aerated.       TUBE STATUS: N/A       MASTOID CAVITY: N/A       HEARING: Gross hearing assessment is within normal limits with right hearing aid.      NOSE:       VISUALIZED USING: Anterior rhinoscopy with headlight and nasal speculum.       DORSUM: Midline, nontraumatic appearance.       MUCOSA: Normal-appearing.       SECRETIONS: Normal.       SEPTUM: Midline and nonobstructing.       INFERIOR TURBINATES: Normal.       MIDDLE TURBINATES/MEATUS: N/A       BLEEDING: N/A         ORAL CAVITY/PHARYNX:       TEETH: " Adequate dentition.       TONGUE: No mass or lesion.  Normal mobility.       FLOOR OF MOUTH: No mass or lesion.       PALATE: Normal hard palate, soft palate, and uvula.       OROPHARYNX: Normal without mass or lesion.       BUCCAL MUCOSA/GBS: Normal without mass or lesion.       LIPS: Normal.    LARYNX/HYPOPHARYNX/NASOPHARYNX: N/A    NECK: No palpable masses or abnormal adenopathy.  Trachea is midline.    THYROID: No thyromegaly or palpable nodule.    SALIVARY GLANDS: Normal bilateral parotid and submandibular glands by inspection and palpation.    TMJ's: Normal.    NEURO: Cranial nerve exam grossly normal bilaterally.       Assessment/Plan   Enriqueta was seen today for new patient visit.  Diagnoses and all orders for this visit:  Bilateral impacted cerumen (Primary)  Sensorineural hearing loss (SNHL) of both ears     Enriqueta is very sensitive attempted irrigation she complained of pain tolerated instrument cerumen removal better.  Daughter reports she is sensitive all the time including mowing her nails are filed.  She will continue wearing bilateral hearing aids once the new left 1 is received, outside audiology and hearing aids.  I recommended mineral oil 2 drops to both ears twice a month to help keep wax softened.    No follow-ups on file.     Amrita Whiteside, APRN-CNP         [1]   Past Medical History:  Diagnosis Date    Alzheimer's disease with late onset (Multi)     Duke's esophagus     Cervical stenosis of spine     Hemorrhoids     Hiatal hernia     High blood pressure     High cholesterol     Ovarian cyst     right    PTSD (post-traumatic stress disorder)     Schatzki's ring     Vertigo     Vitamin D deficiency    [2]   Past Surgical History:  Procedure Laterality Date    APPENDECTOMY      BLADDER SURGERY      prolapse    HAND SURGERY Right 2020   [3]   Current Outpatient Medications:     cholecalciferol (Vitamin D3) 50 mcg (2,000 unit) capsule, 1 capsule Orally Once a day, Disp: , Rfl:     DILT- mg  "24 hr capsule, TAKE 1 CAPSULE BY MOUTH EVERY DAY, Disp: 90 capsule, Rfl: 3    esomeprazole (NexIUM) 40 mg DR capsule, Take 1 capsule (40 mg) by mouth once daily., Disp: 90 capsule, Rfl: 3    estradiol (Estrace) 0.01 % (0.1 mg/gram) vaginal cream, Insert 0.125 Applicatorfuls (0.5 g) into the vagina once daily. Nightly for 2 weeks, then twice weekly., Disp: 42.5 g, Rfl: 3    hydroCHLOROthiazide (HYDRODiuril) 25 mg tablet, Take 1 tablet (25 mg) by mouth every 3 days. POA is giving medication every 3 days, Disp: , Rfl:     Lactobacillus acidophilus (PROBIOTIC ACIDOPHILUS ORAL), Probiotic, Disp: , Rfl:     multivit-minerals/folic acid (ADULT MULTIVITAMIN GUMMIES ORAL), as directed Orally, Disp: , Rfl:     ibuprofen (AdviL) 200 mg tablet, Take 3 tablets (600 mg) by mouth 3 times a day as needed for mild pain (1 - 3)., Disp: , Rfl:     ramipril (Altace) 10 mg capsule, TAKE 1 CAPSULE BY MOUTH ONCE A DAY. ORALLY ONCE A DAY 90 DAYS ORALLY ONCE A DAY 90 DAYS, Disp: 90 capsule, Rfl: 3  [4]   Allergies  Allergen Reactions    Loratadine Other and Unknown     Tingling, arms, legs, jaw    Tingling, arms, legs, jaw      Other Reaction(s): Other    Cephalexin Dermatitis and Itching    Decongestant Tablet Unknown    Donepezil Other and Unknown     Other Reaction(s): Mental Status Change    Donepezil Hcl Hallucinations    Escitalopram Oxalate Confusion and Drowsiness    Memantine Other and Myalgia     Other Reaction(s): Myalgia      Other Reaction(s): Other    Other Unknown     Decongestants\"    Tramadol Confusion and Drowsiness    Sulfamethoxazole-Trimethoprim Nausea And Vomiting, Other and Nausea/vomiting     vomit    Other Reaction(s): Other      vomit     "

## 2025-07-05 ENCOUNTER — OFFICE VISIT (OUTPATIENT)
Dept: URGENT CARE | Age: OVER 89
End: 2025-07-05
Payer: MEDICARE

## 2025-07-05 VITALS
OXYGEN SATURATION: 99 % | BODY MASS INDEX: 26.26 KG/M2 | TEMPERATURE: 96.5 F | WEIGHT: 130 LBS | RESPIRATION RATE: 14 BRPM | HEART RATE: 57 BPM | SYSTOLIC BLOOD PRESSURE: 162 MMHG | DIASTOLIC BLOOD PRESSURE: 72 MMHG

## 2025-07-05 DIAGNOSIS — N30.00 ACUTE CYSTITIS WITHOUT HEMATURIA: Primary | ICD-10-CM

## 2025-07-05 DIAGNOSIS — R30.0 DYSURIA: ICD-10-CM

## 2025-07-05 LAB
POC APPEARANCE, URINE: CLEAR
POC BILIRUBIN, URINE: NEGATIVE
POC BLOOD, URINE: NEGATIVE
POC COLOR, URINE: ABNORMAL
POC GLUCOSE, URINE: NEGATIVE MG/DL
POC KETONES, URINE: NEGATIVE MG/DL
POC LEUKOCYTES, URINE: ABNORMAL
POC NITRITE,URINE: NEGATIVE
POC PH, URINE: 6 PH
POC PROTEIN, URINE: NEGATIVE MG/DL
POC SPECIFIC GRAVITY, URINE: 1.01
POC UROBILINOGEN, URINE: 0.2 EU/DL

## 2025-07-05 RX ORDER — CIPROFLOXACIN 500 MG/1
500 TABLET, FILM COATED ORAL 2 TIMES DAILY
Qty: 10 TABLET | Refills: 0 | Status: SHIPPED | OUTPATIENT
Start: 2025-07-05 | End: 2025-07-10

## 2025-07-05 NOTE — PROGRESS NOTES
Subjective   Patient ID: Enriqueta Dumont is a 89 y.o. female. They present today with a chief complaint of Difficulty Frequency (3 days, change in mental status, increased frequency.).    History of Present Illness  HPI  Patient presents with complaints of difficulty urinating. Patient is a resident at a long term care facility (hx dementia) and the staff has noticed a change in mental status x 3 days. She is accompanied by her daughter. Her daughter states that she has had multiple UTIs in the last few months but that it is not being cultured so she fears they are not using the appropriate antibiotics, she was most recently on Macrobid.     Past Medical History  Allergies as of 07/05/2025 - Reviewed 07/05/2025   Allergen Reaction Noted    Loratadine Other and Unknown 10/18/2012    Cephalexin Dermatitis and Itching 10/11/2023    Decongestant tablet Unknown 07/13/2024    Donepezil Other and Unknown 08/26/2023    Donepezil hcl Hallucinations 08/26/2023    Escitalopram oxalate Confusion and Drowsiness 04/22/2025    Memantine Other and Myalgia 08/26/2023    Other Unknown 08/26/2023    Tramadol Confusion and Drowsiness 04/22/2025    Sulfamethoxazole-trimethoprim Nausea And Vomiting, Other, and Nausea/vomiting 05/09/2002       Prescriptions Prior to Admission[1]     Medical History[2]    Surgical History[3]     reports that she has never smoked. She has never been exposed to tobacco smoke. She has never used smokeless tobacco. She reports that she does not currently use alcohol. She reports that she does not use drugs.    Review of Systems  Review of Systems  ROS is negative unless otherwise stated in HPI.                              Objective    Vitals:    07/05/25 1307   BP: 162/72   BP Location: Left arm   Patient Position: Sitting   BP Cuff Size: Adult long   Pulse: 57   Resp: 14   Temp: 35.8 °C (96.5 °F)   TempSrc: Oral   SpO2: 99%   Weight: 59 kg (130 lb)     No LMP recorded. Patient has had a  hysterectomy.    Physical Exam  VS: As documented in the triage note and EMR flowsheet from this visit was reviewed  General: Well appearing. No acute distress.   Eyes:  Extraocular movements grossly intact. No scleral icterus.   Head: Atraumatic. Normocephalic.     Neck: No meningismus. No gross masses. Full movement through range of motion  CV: Regular rhythm. No murmurs, rubs, gallops appreciated.   Resp: Clear to auscultation bilaterally. No respiratory distress.    GI: Nontender. Soft. No masses. No rebound, rigidity or guarding. Bowel sounds normoactive x 4. No CVA tenderness.   MSK: Symmetric muscle bulk. No gross step offs or deformities.  Skin: Warm, dry. No rashes  Neuro: CN II-VII intact. A&O x3. Speech fluent. Moving all extremities. Ambulates with normal gait. Slight confusion regarding history of symptoms.  Psych: Appropriate mood and affect for situation        Procedures    Point of Care Test & Imaging Results from this visit  Results for orders placed or performed in visit on 07/05/25   POCT UA Automated manually resulted   Result Value Ref Range    POC Color, Urine Light-Yellow Straw, Yellow, Light-Yellow    POC Appearance, Urine Clear Clear    POC Glucose, Urine NEGATIVE NEGATIVE mg/dl    POC Bilirubin, Urine NEGATIVE NEGATIVE    POC Ketones, Urine NEGATIVE NEGATIVE mg/dl    POC Specific Gravity, Urine 1.010 1.005 - 1.035    POC Blood, Urine NEGATIVE NEGATIVE    POC PH, Urine 6.0 No Reference Range Established PH    POC Protein, Urine NEGATIVE NEGATIVE mg/dl    POC Urobilinogen, Urine 0.2 0.2, 1.0 EU/DL    Poc Nitrite, Urine NEGATIVE NEGATIVE    POC Leukocytes, Urine LARGE (3+) (A) NEGATIVE      Imaging  No results found.    Cardiology, Vascular, and Other Imaging  No other imaging results found for the past 2 days      Diagnostic study results (if any) were reviewed by Alayna Omalley PA-C.    Assessment/Plan   Allergies, medications, history, and pertinent labs/EKGs/Imaging reviewed by lAayna  CHLOE Omalley.     Medical Decision Making  The patient was evaluated for above complaints in [facility]. Differential diagnoses include urinary tract infection, pyelonephritis. The patient has UTI, pyelonephritis not likely given clinical presentation. Patient educated on treatment plan consisting of Ciprofloxacin. Patient provided with return precaution and can follow up with PCP if no improvement.       Orders and Diagnoses  Diagnoses and all orders for this visit:  Acute cystitis without hematuria  -     ciprofloxacin (Cipro) 500 mg tablet; Take 1 tablet (500 mg) by mouth 2 times a day for 5 days.  Dysuria  -     POCT UA Automated manually resulted  -     Urine Culture      Medical Admin Record      Patient disposition: Home    Electronically signed by Alayna Omalley PA-C  1:40 PM           [1] (Not in a hospital admission)   [2]   Past Medical History:  Diagnosis Date    Alzheimer's disease with late onset (Multi)     Duke's esophagus     Cervical stenosis of spine     Hemorrhoids     Hiatal hernia     High blood pressure     High cholesterol     Ovarian cyst     right    PTSD (post-traumatic stress disorder)     Schatzki's ring     Vertigo     Vitamin D deficiency    [3]   Past Surgical History:  Procedure Laterality Date    APPENDECTOMY      BLADDER SURGERY      prolapse    HAND SURGERY Right 2020

## 2025-07-07 LAB — BACTERIA UR CULT: ABNORMAL
